# Patient Record
Sex: MALE | Race: WHITE
[De-identification: names, ages, dates, MRNs, and addresses within clinical notes are randomized per-mention and may not be internally consistent; named-entity substitution may affect disease eponyms.]

---

## 2019-02-24 ENCOUNTER — HOSPITAL ENCOUNTER (EMERGENCY)
Dept: HOSPITAL 50 - VM.ED | Age: 19
Discharge: HOME | End: 2019-02-24
Payer: COMMERCIAL
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
## 2019-02-24 DIAGNOSIS — S01.81XA: Primary | ICD-10-CM
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
## 2019-02-24 NOTE — EDM.PDOC
ED HPI GENERAL MEDICAL PROBLEM





- General


Chief Complaint: Skin Complaint


Stated Complaint: ELBOW TO FOREHEAD PLAYING BASKEBALL


Time Seen by Provider: 02/24/19 21:50


Source of Information: Reports: Patient, RN, RN Notes Reviewed


History Limitations: Reports: No Limitations





- History of Present Illness


INITIAL COMMENTS - FREE TEXT/NARRATIVE: 


Patient presents to the ED at [facility] after he sustained a head injury 

while playing intramural basketball earlier this evening. Patient states 

another player's elbow hit him on the face next to the left eyebrow. He did not 

have any LOC. He remember the entire injury. This is not a work related 

injured. This was during an unorganized sporting event. Patient states he has 

minor pain to the laceration site. No previous injury or trauma. No visual 

field disturbances. Patient denies any other pain.


Onset: Today, Sudden


Onset Date: 02/24/19





ED ROS GENERAL





- Review of Systems


Review Of Systems: See Below


Constitutional: Denies: Fever, Chills


Respiratory: Denies: Shortness of Breath, Cough


Cardiovascular: Denies: Chest Pain, Palpitations


Skin: Reports: Wound


Neurological: Reports: No Symptoms





ED EXAM, SKIN/RASH


Exam: See Below


Exam Limited By: No Limitations


General Appearance: Alert, No Apparent Distress


Eye Exam: Bilateral Eye: EOMI, Normal Inspection, PERRL


Head: Atraumatic, Normocephalic


Neck: Non-Tender, Full Range of Motion


Respiratory/Chest: No Respiratory Distress, Lungs Clear, Normal Breath Sounds


Cardiovascular: Normal Peripheral Pulses, Regular Rate, Rhythm


Peripheral Pulses: 2+: Radial (L), Radial (R)


Neurological: Alert, Oriented, Normal Cognition


Skin: Warm, Dry, Normal Color, Wound/Incision (2.4cm vertical laceration left 

eyebrow; no bleeding; area looks clean)





ED SKIN PROCEDURES





- Laceration/Wound Repair


  ** Left Forehead


Lac/Wound length In cm: 2.4


Appearance: Subcutaneous, Linear, Clean


Distal NVT: Neuro & Vascular Intact


Anesthetic Type: Other (None)


Skin Prep: Saline


Exploration/Debridement/Repair: Wound Explored, In a Bloodless Field, Explored 

to Base, No Foreign Material Found


Closed with: Dermabond


Sterile Dressing Applied: Nurse


Tetanus Status Addressed: Yes


Complications: No





Departure





- Departure


Time of Disposition: 22:06


Disposition: Home, Self-Care 01


Condition: Good


Clinical Impression: 


Forehead laceration


Qualifiers:


 Encounter type: initial encounter Qualified Code(s): S01.81XA - Laceration 

without foreign body of other part of head, initial encounter








- Discharge Information


*PRESCRIPTION DRUG MONITORING PROGRAM REVIEWED*: Not Applicable


*COPY OF PRESCRIPTION DRUG MONITORING REPORT IN PATIENT STEVEN: Not Applicable


Instructions:  Stitches, Staples, or Adhesive Wound Closure, Laceration Care, 

Adult


Forms:  ED Department Discharge


Additional Instructions: 


1. Stay well hydrated and rest


2. Keep area clean and dry


3. Keep band aid on for the next couple of days


4. Do not remove glue, it will come off on its own


5. See your PCP as symptoms warrant





- Problem List Review


Problem List Initiated/Reviewed/Updated: Yes





- Assessment/Plan


Assessment:: 


Forehead laceration


Plan: 


Area was closed with Dermabond. Patient tolerated well. Discussed care of the 

laceration. Keep clean and dry. Keep covered for the next couple days. See PCP 

as symptoms warrant